# Patient Record
(demographics unavailable — no encounter records)

---

## 2025-01-21 NOTE — PHYSICAL EXAM
[No Acute Distress] : no acute distress [Normal Sclera/Conjunctiva] : normal sclera/conjunctiva [PERRL] : pupils equal round and reactive to light [EOMI] : extraocular movements intact [Normal Outer Ear/Nose] : the outer ears and nose were normal in appearance [Normal Oropharynx] : the oropharynx was normal [No JVD] : no jugular venous distention [No Lymphadenopathy] : no lymphadenopathy [Supple] : supple [Thyroid Normal, No Nodules] : the thyroid was normal and there were no nodules present [No Respiratory Distress] : no respiratory distress  [No Accessory Muscle Use] : no accessory muscle use [Clear to Auscultation] : lungs were clear to auscultation bilaterally [Normal Rate] : normal rate  [Regular Rhythm] : with a regular rhythm [Normal S1, S2] : normal S1 and S2 [No Murmur] : no murmur heard [No Abdominal Bruit] : a ~M bruit was not heard ~T in the abdomen [No Varicosities] : no varicosities [No Edema] : there was no peripheral edema [No Palpable Aorta] : no palpable aorta [No Extremity Clubbing/Cyanosis] : no extremity clubbing/cyanosis [Soft] : abdomen soft [Non-distended] : non-distended [No Masses] : no abdominal mass palpated [No CVA Tenderness] : no CVA  tenderness [No Spinal Tenderness] : no spinal tenderness [No Joint Swelling] : no joint swelling [Grossly Normal Strength/Tone] : grossly normal strength/tone [No Rash] : no rash [Coordination Grossly Intact] : coordination grossly intact [No Focal Deficits] : no focal deficits [Normal Gait] : normal gait [Deep Tendon Reflexes (DTR)] : deep tendon reflexes were 2+ and symmetric [Normal Affect] : the affect was normal [Normal Insight/Judgement] : insight and judgment were intact [de-identified] : Pale appearing [de-identified] : Mild tenderness superior to umbilicus

## 2025-01-21 NOTE — PHYSICAL EXAM
[No Acute Distress] : no acute distress [Normal Sclera/Conjunctiva] : normal sclera/conjunctiva [PERRL] : pupils equal round and reactive to light [EOMI] : extraocular movements intact [Normal Outer Ear/Nose] : the outer ears and nose were normal in appearance [Normal Oropharynx] : the oropharynx was normal [No JVD] : no jugular venous distention [No Lymphadenopathy] : no lymphadenopathy [Supple] : supple [Thyroid Normal, No Nodules] : the thyroid was normal and there were no nodules present [No Respiratory Distress] : no respiratory distress  [No Accessory Muscle Use] : no accessory muscle use [Clear to Auscultation] : lungs were clear to auscultation bilaterally [Normal Rate] : normal rate  [Regular Rhythm] : with a regular rhythm [Normal S1, S2] : normal S1 and S2 [No Murmur] : no murmur heard [No Abdominal Bruit] : a ~M bruit was not heard ~T in the abdomen [No Varicosities] : no varicosities [No Edema] : there was no peripheral edema [No Palpable Aorta] : no palpable aorta [No Extremity Clubbing/Cyanosis] : no extremity clubbing/cyanosis [Soft] : abdomen soft [Non-distended] : non-distended [No Masses] : no abdominal mass palpated [No CVA Tenderness] : no CVA  tenderness [No Spinal Tenderness] : no spinal tenderness [No Joint Swelling] : no joint swelling [Grossly Normal Strength/Tone] : grossly normal strength/tone [No Rash] : no rash [Coordination Grossly Intact] : coordination grossly intact [No Focal Deficits] : no focal deficits [Normal Gait] : normal gait [Deep Tendon Reflexes (DTR)] : deep tendon reflexes were 2+ and symmetric [Normal Affect] : the affect was normal [Normal Insight/Judgement] : insight and judgment were intact [de-identified] : Pale appearing [de-identified] : Mild tenderness superior to umbilicus

## 2025-01-21 NOTE — END OF VISIT
[] : Resident [FreeTextEntry3] : Patient visited the emergency room yesterday for a near syncopal episode.  She was treated and released but later in the day she developed a bout of hematochezia.  She had another episode of milder hematochezia this morning.  She has a remote history of peptic ulcer disease.  She also vomited yesterday but had no blood or coffee ground emesis.  She appears to be stable today with no orthostatic changes but given her near syncope followed by acute GI bleeding, we have recommended that she go back to the emergency room for further evaluation since another episode of bleeding could be deleterious to her health.  She is not willing to do that at this time.  Will therefore check her CBC and start pantoprazole 40 mg daily.  While the likelihood of an upper GI bleeding causing her symptoms is remote, she has history of peptic ulcer disease.  She has an appointment to see the gastroenterologist on Thursday.  She is strongly advised to go to the emergency room if she has further GI bleeding or feels lightheaded.  In that event she is told to call 911 rather then attempt to get there herself..

## 2025-01-21 NOTE — HISTORY OF PRESENT ILLNESS
[FreeTextEntry1] : f/u following syncopal/pre-syncopal episode w/ GIB [de-identified] : Patient is a 62yo F w/ pmhx of Breast cancer (BRCA +) s/p BL mastectomy (2019) now in remission, kidney stones, and anemia who presents after ED visit yesterday for syncopal vs pre-syncopal episode. Patient states she was having dinner yesterday with a friend and after eating felt lightheaded and nauseas. States that as she was walking out of the restaurant, she fell into her friend's arms and doesn't remember exactly what happened. Per ED note her friend reported that she didn't lose full consciousness. EMS took the patient to [facility] ED where she had fingerstick and EKG. [place] fine and was discharged without further workup. When she arrived home last night she had an episode of hematochezia with some melena as well. Reports episode of hematochezia this morning at 10AM as well, no further BM's since. States the bloody BM this morning was lighter than yesterday. States she feels 80% today, no longer dizzy but still feeling nauseas. Limited PO intake. Also endorses some discomfort super to the umbilicus, non-radiating. Denies any regular Ibuprofen use, uses it once every couple of weeks. Also notes episode of NBNB vomiting yesterday evening which looked like food content.   Fam hx: sister with colon cancer, several family members with breast cancer Social: Denies any tobacco use. Endorses socia alcohol use. Denies any drug use HCM: Has never had a colonoscopy

## 2025-01-21 NOTE — HEALTH RISK ASSESSMENT
[Fair] : ~his/her~ current health as fair  [Good] : ~his/her~  mood as  good [Yes] : Yes [Monthly or less (1 pt)] : Monthly or less (1 point) [1 or 2 (0 pts)] : 1 or 2 (0 points) [Never (0 pts)] : Never (0 points) [One fall no injury in past year] : Patient reported one fall in the past year without injury [0] : 2) Feeling down, depressed, or hopeless: Not at all (0) [PHQ-2 Negative - No further assessment needed] : PHQ-2 Negative - No further assessment needed [Fully functional (bathing, dressing, toileting, transferring, walking, feeding)] : Fully functional (bathing, dressing, toileting, transferring, walking, feeding) [Fully functional (using the telephone, shopping, preparing meals, housekeeping, doing laundry, using] : Fully functional and needs no help or supervision to perform IADLs (using the telephone, shopping, preparing meals, housekeeping, doing laundry, using transportation, managing medications and managing finances) [Never] : Never [0-4] : 0-4 [[id number]] : 0

## 2025-01-21 NOTE — HEALTH RISK ASSESSMENT
[Fair] : ~his/her~ current health as fair  [Good] : ~his/her~  mood as  good [Yes] : Yes [Monthly or less (1 pt)] : Monthly or less (1 point) [1 or 2 (0 pts)] : 1 or 2 (0 points) [Never (0 pts)] : Never (0 points) [One fall no injury in past year] : Patient reported one fall in the past year without injury [0] : 2) Feeling down, depressed, or hopeless: Not at all (0) [PHQ-2 Negative - No further assessment needed] : PHQ-2 Negative - No further assessment needed [Fully functional (bathing, dressing, toileting, transferring, walking, feeding)] : Fully functional (bathing, dressing, toileting, transferring, walking, feeding) [Fully functional (using the telephone, shopping, preparing meals, housekeeping, doing laundry, using] : Fully functional and needs no help or supervision to perform IADLs (using the telephone, shopping, preparing meals, housekeeping, doing laundry, using transportation, managing medications and managing finances) [Never] : Never [0-4] : 0-4 [[id number]] : 0

## 2025-01-21 NOTE — HISTORY OF PRESENT ILLNESS
[FreeTextEntry1] : f/u following syncopal/pre-syncopal episode w/ GIB [de-identified] : Patient is a 64yo F w/ pmhx of Breast cancer (BRCA +) s/p BL mastectomy (2019) now in remission, kidney stones, and anemia who presents after ED visit yesterday for syncopal vs pre-syncopal episode. Patient states she was having dinner yesterday with a friend and after eating felt lightheaded and nauseas. States that as she was walking out of the restaurant, she fell into her friend's arms and doesn't remember exactly what happened. Per ED note her friend reported that she didn't lose full consciousness. EMS took the patient to [facility] ED where she had fingerstick and EKG. [place] fine and was discharged without further workup. When she arrived home last night she had an episode of hematochezia with some melena as well. Reports episode of hematochezia this morning at 10AM as well, no further BM's since. States the bloody BM this morning was lighter than yesterday. States she feels 80% today, no longer dizzy but still feeling nauseas. Limited PO intake. Also endorses some discomfort super to the umbilicus, non-radiating. Denies any regular Ibuprofen use, uses it once every couple of weeks. Also notes episode of NBNB vomiting yesterday evening which looked like food content.   Fam hx: sister with colon cancer, several family members with breast cancer Social: Denies any tobacco use. Endorses socia alcohol use. Denies any drug use HCM: Has never had a colonoscopy

## 2025-01-23 NOTE — ASSESSMENT
[FreeTextEntry1] :  62 yo female with past medical history of breast cancer, anemia, kidney stones, presyncope she was recently referred from the hospital. has symptoms concerning for stomach ulcer w/ possible UGI bleed in the setting of maroon and black stools. Given recent nausea and presyncopal event with small drop in hgb will plan for EGD. Patient also due for screening colonoscopy at this time. Will plan for double.    #Nausea #Melena #c/f UGIB -continue Pantoprazole daily -Plan for EGD for concern for UGIB and possible stomach ulcer vs dulofoy, kay lesion, gastritis, etc. Will expedite procedure -Counseled on alarm signs and need to present to ED if concerned for worsening Sx  #colon cancer screening  #family history of colon cancer -Plan for outpatient Colonoscopy for colon cancer screening. -recommended miralax daily until then -Golytely ordered to be started at 4pm day prior. -CLD day prior -Plan to be done at [facility]  Patient seen and discussed with GI Attending Physician Dr. Alphonso Alarcon DO, PhD Gastroenterology Fellow [facility]

## 2025-01-23 NOTE — PHYSICAL EXAM
[Alert] : alert [No Acute Distress] : no acute distress [Sclera] : the sclera and conjunctiva were normal [Hearing Threshold Finger Rub Not [place]] : hearing was normal [Oropharynx] : the oropharynx was normal [Normal Appearance] : the appearance of the neck was normal [No Respiratory Distress] : no respiratory distress [Heart Rate And Rhythm] : heart rate was normal and rhythm regular [None] : no edema [Abdomen Tenderness] : non-tender [No Masses] : no abdominal mass palpated [Abdomen Soft] : soft [Abnormal Walk] : normal gait [Normal Color / Pigmentation] : normal skin color and pigmentation [] : no rash [No Focal Deficits] : no focal deficits [Oriented To Time, Place, And Person] : oriented to person, place, and time [Ascites: ___] : no ascites [Rebound Tenderness] : no rebound tenderness